# Patient Record
Sex: FEMALE | Race: BLACK OR AFRICAN AMERICAN | NOT HISPANIC OR LATINO | URBAN - METROPOLITAN AREA
[De-identification: names, ages, dates, MRNs, and addresses within clinical notes are randomized per-mention and may not be internally consistent; named-entity substitution may affect disease eponyms.]

---

## 2017-02-01 ENCOUNTER — OUTPATIENT (OUTPATIENT)
Dept: OUTPATIENT SERVICES | Facility: HOSPITAL | Age: 32
LOS: 1 days | End: 2017-02-01

## 2017-03-02 DIAGNOSIS — R69 ILLNESS, UNSPECIFIED: ICD-10-CM

## 2021-09-23 ENCOUNTER — EMERGENCY (EMERGENCY)
Facility: HOSPITAL | Age: 36
LOS: 1 days | Discharge: HOME | End: 2021-09-23
Attending: EMERGENCY MEDICINE
Payer: COMMERCIAL

## 2021-09-23 VITALS
TEMPERATURE: 99 F | DIASTOLIC BLOOD PRESSURE: 99 MMHG | WEIGHT: 235.89 LBS | HEIGHT: 64 IN | SYSTOLIC BLOOD PRESSURE: 159 MMHG | HEART RATE: 93 BPM | OXYGEN SATURATION: 100 % | RESPIRATION RATE: 18 BRPM
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
## 2021-09-23 DIAGNOSIS — S01.111A LACERATION WITHOUT FOREIGN BODY OF RIGHT EYELID AND PERIOCULAR AREA, INITIAL ENCOUNTER: ICD-10-CM

## 2021-09-23 DIAGNOSIS — Z88.5 ALLERGY STATUS TO NARCOTIC AGENT: ICD-10-CM
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
## 2021-09-23 PROCEDURE — 12011 RPR F/E/E/N/L/M 2.5 CM/<: CPT

## 2021-09-23 PROCEDURE — 99283 EMERGENCY DEPT VISIT LOW MDM: CPT | Mod: 25

## 2021-09-23 PROCEDURE — 99282 EMERGENCY DEPT VISIT SF MDM: CPT | Mod: 25
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
## 2021-09-24 NOTE — ED PROVIDER NOTE - ATTENDING CONTRIBUTION TO CARE
I personally saw the patient with the PA, and completed the key components of the history and physical exam. I then discussed the management plan with the PA.   gen in nad resp clear cardiac no murmur abd soft neuro intact skin as doceuemnted by pa

## 2021-09-24 NOTE — ED PROVIDER NOTE - PATIENT PORTAL LINK FT
You can access the FollowMyHealth Patient Portal offered by [facility] by registering at the following website: [email or web address]. By joining [facility]’s FollowMyHealth portal, you will also be able to view your health information using other applications (apps) compatible with our system.

## 2021-09-24 NOTE — ED PROVIDER NOTE - NS ED MD DISPO DISCHARGE
Pt notified that Dr. Palomares will call her today after clinic. Pt verbalized understanding.    Home
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
## 2021-09-24 NOTE — ED PROVIDER NOTE - PHYSICAL EXAMINATION
HEENT: atraumatic, no raccoon eyes, no pedroza sings, no hemotympanum, PERRL, EOMI, no nystagmus, no dental injuries  Neck: supple, no midline tenderness to palpation, nt to cervical spine paraspinal m,+ FROM, NEXUS negative, no abrasions, no ecchymosis  Chest: non tender, equal expansion bilaterally, no ecchymosis, no abrasions,   Lungs: CTA, good air entry bilaterally, no wheezing, no rales, no rhonchi  Abdomen: soft, non tender, no guarding, no rebound, no distention, no ecchymosis  Back: no midline tenderness to palpation   Extremities: atraumatic, + FROM, 5/5 strength  Skin: no rash, 2 cm laceration of right eyebrow, no abrasion  Neuro: A & O x 3, clear speech, steady gait, cerrebellar intact, no focal deficits, CN II-XII intact, neg pronator sign

## 2021-09-24 NOTE — ED PROVIDER NOTE - CLINICAL SUMMARY MEDICAL DECISION MAKING FREE TEXT BOX
laceration of right eyebrow, [place] ct negative, pt requesting dermabond, dermabond applied, Pt reassessed, pt feeling better at this time, vss, pt able to walk, talk and vocalized plan of action. Discussed in depth and explained to pt in depth the next steps that need to be taking including proper follow up with PCP or specialists. All incidental findings were discussed with pt as well. Pt verbalized their concerns and all questions were answered. Pt understands dispo and wants discharge. Given good instructions when to return to ED and importance of f/u.

## 2023-09-21 NOTE — ED PROVIDER NOTE - NS ED MD DISPO DISCHARGE CCDA
Patient/Caregiver provided printed discharge information.
- suspected anemia of chronic disease /  no signs of bleeding